# Patient Record
Sex: MALE | Race: WHITE | NOT HISPANIC OR LATINO | Employment: UNEMPLOYED | URBAN - NONMETROPOLITAN AREA
[De-identification: names, ages, dates, MRNs, and addresses within clinical notes are randomized per-mention and may not be internally consistent; named-entity substitution may affect disease eponyms.]

---

## 2017-11-17 ENCOUNTER — APPOINTMENT (OUTPATIENT)
Dept: CT IMAGING | Facility: HOSPITAL | Age: 32
End: 2017-11-17

## 2017-11-17 ENCOUNTER — HOSPITAL ENCOUNTER (EMERGENCY)
Facility: HOSPITAL | Age: 32
Discharge: HOME | End: 2017-11-17
Attending: STUDENT IN AN ORGANIZED HEALTH CARE EDUCATION/TRAINING PROGRAM | Admitting: STUDENT IN AN ORGANIZED HEALTH CARE EDUCATION/TRAINING PROGRAM

## 2017-11-17 VITALS
TEMPERATURE: 98.2 F | HEIGHT: 66 IN | OXYGEN SATURATION: 97 % | BODY MASS INDEX: 25.88 KG/M2 | RESPIRATION RATE: 18 BRPM | DIASTOLIC BLOOD PRESSURE: 84 MMHG | SYSTOLIC BLOOD PRESSURE: 130 MMHG | WEIGHT: 161 LBS | HEART RATE: 64 BPM

## 2017-11-17 DIAGNOSIS — R13.10 DYSPHAGIA, UNSPECIFIED TYPE: Primary | ICD-10-CM

## 2017-11-17 PROCEDURE — 71250 CT THORAX DX C-: CPT

## 2017-11-17 PROCEDURE — 70490 CT SOFT TISSUE NECK W/O DYE: CPT

## 2017-11-17 PROCEDURE — 99282 EMERGENCY DEPT VISIT SF MDM: CPT

## 2017-11-18 NOTE — ED PROVIDER NOTES
Subjective   HPI Comments: Patient is a 32-year-old male who presents with concerns for inability to eat solid foods.  Mother states that since beginning of the month he had difficulty swallowing solids.  He states he can drink broth soups milkshakes or anything soft.  They did see his primary care provider on the eighth of the month or 9 days ago was going to set him up for a scan.  She states that she contacted him again and they had not heard anything back from his insurance.  She states that the patient is losing weight.  Patient has mental retardation and it is difficult to elicit a history from him.  When I asked if he hurts anywhere he points to his neck as well as his epigastric region.      Review of Systems   All other systems reviewed and are negative.      Past Medical History:   Diagnosis Date   • Bipolar disorder    • Depression    • Disease of thyroid gland    • Mental retardation        Allergies   Allergen Reactions   • Penicillins Other (See Comments)     Mom states pt had a seizure as a child that they said was from the medication       Past Surgical History:   Procedure Laterality Date   • HERNIA REPAIR         History reviewed. No pertinent family history.    Social History     Social History   • Marital status: Single     Spouse name: N/A   • Number of children: N/A   • Years of education: N/A     Social History Main Topics   • Smoking status: Never Smoker   • Smokeless tobacco: None   • Alcohol use No   • Drug use: No   • Sexual activity: Not Asked     Other Topics Concern   • None     Social History Narrative   • None           Objective   Physical Exam   Nursing note and vitals reviewed.  GEN: No acute distress  Head: Normocephalic, atraumatic  Eyes: Pupils equal round reactive to light  ENT: Posterior pharynx normal in appearance, oral mucosa is moist  Chest: Nontender to palpation  Cardiovascular: Regular rate  Lungs: Clear to auscultation bilaterally  Abdomen: Soft, nontender,  nondistended, no peritoneal signs  Extremities: No edema, normal appearance  Neuro: GCS 15  Psych: Mood and affect are appropriate    Procedures         ED Course  ED Course                  MDM  Number of Diagnoses or Management Options  Dysphagia, unspecified type:   Diagnosis management comments: CT soft tissue neck and chest without contrast showed no acute abnormalities to explain the patient's symptoms.  He does have mild lymphadenopathy in the left submandibular glands.  I did give mother a copy of the reports and did give him a referral to gastroenterology.  Patient does appear healthy and I believe he is appropriate for outpatient follow-up       Amount and/or Complexity of Data Reviewed  Independent visualization of images, tracings, or specimens: yes        Final diagnoses:   Dysphagia, unspecified type            Romel Dietz MD  11/17/17 [id number]